# Patient Record
Sex: FEMALE | Race: OTHER | HISPANIC OR LATINO
[De-identification: names, ages, dates, MRNs, and addresses within clinical notes are randomized per-mention and may not be internally consistent; named-entity substitution may affect disease eponyms.]

---

## 2023-12-18 PROBLEM — Z00.00 ENCOUNTER FOR PREVENTIVE HEALTH EXAMINATION: Status: ACTIVE | Noted: 2023-12-18

## 2024-01-03 ENCOUNTER — APPOINTMENT (OUTPATIENT)
Dept: OBGYN | Facility: CLINIC | Age: 39
End: 2024-01-03
Payer: COMMERCIAL

## 2024-01-03 ENCOUNTER — NON-APPOINTMENT (OUTPATIENT)
Age: 39
End: 2024-01-03

## 2024-01-03 VITALS
HEIGHT: 60 IN | WEIGHT: 118 LBS | SYSTOLIC BLOOD PRESSURE: 110 MMHG | BODY MASS INDEX: 23.16 KG/M2 | DIASTOLIC BLOOD PRESSURE: 80 MMHG

## 2024-01-03 DIAGNOSIS — Z78.9 OTHER SPECIFIED HEALTH STATUS: ICD-10-CM

## 2024-01-03 DIAGNOSIS — N92.1 EXCESSIVE AND FREQUENT MENSTRUATION WITH IRREGULAR CYCLE: ICD-10-CM

## 2024-01-03 DIAGNOSIS — Z01.419 ENCOUNTER FOR GYNECOLOGICAL EXAMINATION (GENERAL) (ROUTINE) W/OUT ABNORMAL FINDINGS: ICD-10-CM

## 2024-01-03 DIAGNOSIS — N92.6 IRREGULAR MENSTRUATION, UNSPECIFIED: ICD-10-CM

## 2024-01-03 DIAGNOSIS — Z11.3 ENCOUNTER FOR SCREENING FOR INFECTIONS WITH A PREDOMINANTLY SEXUAL MODE OF TRANSMISSION: ICD-10-CM

## 2024-01-03 PROCEDURE — 99385 PREV VISIT NEW AGE 18-39: CPT

## 2024-01-03 PROCEDURE — 36415 COLL VENOUS BLD VENIPUNCTURE: CPT

## 2024-01-03 NOTE — HISTORY OF PRESENT ILLNESS
[TextBox_4] : 37yo P1 here for new gyn evaluation. Her main issues are irreg bleeding- sometimes heavy/ sometimes light and more frequent than normal and infertility.  She has tried to become pregnant for 12 months with her partner. No medical problems. Has had some pelvic pressure/ pain at times but not consistent. She used to go to [facility].  She is in a monogamous relationship. She has a 19yo daughter who is in college currently.

## 2024-01-03 NOTE — PLAN
[FreeTextEntry1] : For labs as above and HSG.  For partner evaluation with his primary care and SA.  f/u in 1 month for next steps.

## 2024-01-03 NOTE — PHYSICAL EXAM
[Chaperone Declined] : Patient declined chaperone [Appropriately responsive] : appropriately responsive [Alert] : alert [No Acute Distress] : no acute distress [No Lymphadenopathy] : no lymphadenopathy [Soft] : soft [Non-tender] : non-tender [Non-distended] : non-distended [No HSM] : No HSM [No Lesions] : no lesions [No Mass] : no mass [Oriented x3] : oriented x3 [Examination Of The Breasts] : a normal appearance [No Masses] : no breast masses were palpable [Labia Majora] : normal [Labia Minora] : normal [Normal] : normal [FreeTextEntry8] : TVUSG performed- somewhat thickened EMS after period-- ovaries normal b/l without cysts or masses.  No FF.  Ovarian size 2.5 x 1.5 b/l. [Uterine Adnexae] : normal

## 2024-01-05 ENCOUNTER — RESULT REVIEW (OUTPATIENT)
Age: 39
End: 2024-01-05

## 2024-01-12 ENCOUNTER — LABORATORY RESULT (OUTPATIENT)
Age: 39
End: 2024-01-12

## 2024-01-12 ENCOUNTER — APPOINTMENT (OUTPATIENT)
Dept: OBGYN | Facility: CLINIC | Age: 39
End: 2024-01-12
Payer: COMMERCIAL

## 2024-01-12 PROCEDURE — 36415 COLL VENOUS BLD VENIPUNCTURE: CPT

## 2024-01-15 LAB
ANTI-MUELLERIAN HORMONE: 0.03 NG/ML
BASOPHILS # BLD AUTO: 0.02 K/UL
BASOPHILS NFR BLD AUTO: 0.3 %
CYTOLOGY CVX/VAG DOC THIN PREP: NORMAL
EOSINOPHIL # BLD AUTO: 0.14 K/UL
EOSINOPHIL NFR BLD AUTO: 1.8 %
FSH SERPL-MCNC: 28.9 IU/L
HCT VFR BLD CALC: 39 %
HGB BLD-MCNC: 12.3 G/DL
HPV HIGH+LOW RISK DNA PNL CVX: NOT DETECTED
IMM GRANULOCYTES NFR BLD AUTO: 0.4 %
LH SERPL-ACNC: 10.7 IU/L
LYMPHOCYTES # BLD AUTO: 1.6 K/UL
LYMPHOCYTES NFR BLD AUTO: 20.6 %
MAN DIFF?: NORMAL
MCHC RBC-ENTMCNC: 29.8 PG
MCHC RBC-ENTMCNC: 31.5 GM/DL
MCV RBC AUTO: 94.4 FL
MONOCYTES # BLD AUTO: 0.64 K/UL
MONOCYTES NFR BLD AUTO: 8.2 %
NEUTROPHILS # BLD AUTO: 5.33 K/UL
NEUTROPHILS NFR BLD AUTO: 68.7 %
PLATELET # BLD AUTO: 320 K/UL
RBC # BLD: 4.13 M/UL
RBC # FLD: 13.5 %
T4 FREE SERPL-MCNC: 1.2 NG/DL
TSH SERPL-ACNC: 0.19 UIU/ML
WBC # FLD AUTO: 7.76 K/UL

## 2024-01-26 ENCOUNTER — TRANSCRIPTION ENCOUNTER (OUTPATIENT)
Age: 39
End: 2024-01-26

## 2024-02-28 ENCOUNTER — APPOINTMENT (OUTPATIENT)
Dept: OBGYN | Facility: CLINIC | Age: 39
End: 2024-02-28
Payer: COMMERCIAL

## 2024-02-28 DIAGNOSIS — N97.9 FEMALE INFERTILITY, UNSPECIFIED: ICD-10-CM

## 2024-02-28 PROCEDURE — 99213 OFFICE O/P EST LOW 20 MIN: CPT

## 2024-02-28 NOTE — HISTORY OF PRESENT ILLNESS
[TextBox_4] : 37yo P1 here for further evaluation for secondary infertility. Seen 1/2023 and had labs and ordered for HSG.  On day of HSG, her beta was 48 and therefore test was cancelled.  Since then she has gotten her period and has tested urine which was neg for hcg. Still wants to try to conceive with current partner. Her partner has high cholesterol but otherwise is healthy.  He has appointment to get semen analysis.  She is also looking into insurance coverage for fertility treatment.   She has reg periods every month. Had episodes of hot flashes more than a year ago and skipped one period but then went back to normal.  Periods used to be longer lasting 10 days but then have become normal/ bleeding less than 7 days and occurring once a month.  No hot flashes or other concerns.  No FH of early ovarian failure.

## 2024-02-28 NOTE — PLAN
[FreeTextEntry1] : AMH was low last month but was currently pregnant- will repeat now. Also was tested twice on 1/3 then 1/12 with same labs- error.  Was supposed to only get hcg repeat.  Will ask billing about this. For repeat appointment for HSG- need to r/o EM polyp as well as check patency of fallopian tubes. Gave info for fertility. To keep monitoring her TFTs.  They TSH is low but free T4 normal.  Enc to repeat in about 6months.

## 2024-02-29 LAB — ANTI-MUELLERIAN HORMONE: 0.15 NG/ML
